# Patient Record
Sex: MALE | Race: WHITE | Employment: UNEMPLOYED | URBAN - METROPOLITAN AREA
[De-identification: names, ages, dates, MRNs, and addresses within clinical notes are randomized per-mention and may not be internally consistent; named-entity substitution may affect disease eponyms.]

---

## 2020-08-30 ENCOUNTER — APPOINTMENT (OUTPATIENT)
Dept: GENERAL RADIOLOGY | Age: 25
End: 2020-08-30
Attending: EMERGENCY MEDICINE

## 2020-08-30 ENCOUNTER — HOSPITAL ENCOUNTER (EMERGENCY)
Age: 25
Discharge: HOME | End: 2020-08-30
Attending: EMERGENCY MEDICINE

## 2020-08-30 VITALS
RESPIRATION RATE: 16 BRPM | SYSTOLIC BLOOD PRESSURE: 148 MMHG | TEMPERATURE: 98.6 F | HEIGHT: 70 IN | DIASTOLIC BLOOD PRESSURE: 72 MMHG | HEART RATE: 81 BPM | WEIGHT: 136.69 LBS | BODY MASS INDEX: 19.57 KG/M2 | OXYGEN SATURATION: 100 %

## 2020-08-30 DIAGNOSIS — S63.501A WRIST SPRAIN, RIGHT, INITIAL ENCOUNTER: Primary | ICD-10-CM

## 2020-08-30 PROCEDURE — 99282 EMERGENCY DEPT VISIT SF MDM: CPT

## 2020-08-30 PROCEDURE — 73130 X-RAY EXAM OF HAND: CPT

## 2020-08-30 PROCEDURE — 73110 X-RAY EXAM OF WRIST: CPT

## 2020-08-30 PROCEDURE — 74011250637 HC RX REV CODE- 250/637: Performed by: EMERGENCY MEDICINE

## 2020-08-30 RX ORDER — HYDROCODONE BITARTRATE AND ACETAMINOPHEN 5; 325 MG/1; MG/1
2 TABLET ORAL
Status: COMPLETED | OUTPATIENT
Start: 2020-08-30 | End: 2020-08-30

## 2020-08-30 RX ORDER — IBUPROFEN 600 MG/1
600 TABLET ORAL
Status: COMPLETED | OUTPATIENT
Start: 2020-08-30 | End: 2020-08-30

## 2020-08-30 RX ORDER — IBUPROFEN 600 MG/1
600 TABLET ORAL
Qty: 20 TAB | Refills: 0 | Status: SHIPPED | OUTPATIENT
Start: 2020-08-30

## 2020-08-30 RX ADMIN — HYDROCODONE BITARTRATE AND ACETAMINOPHEN 2 TABLET: 5; 325 TABLET ORAL at 01:59

## 2020-08-30 RX ADMIN — IBUPROFEN 600 MG: 600 TABLET ORAL at 01:57

## 2020-08-30 NOTE — ED PROVIDER NOTES
EMERGENCY DEPARTMENT HISTORY AND PHYSICAL EXAM      Date: 8/30/2020  Patient Name: Ruth Bonilla  Patient Age and Sex: 23 y.o. male    History of Presenting Illness     Chief Complaint   Patient presents with    Hand Injury     right, punched a car window earlier today. History Provided By: Patient    Ability to gather history was limited by:     HPI: Ruth Bonilla, 23 y.o. male complains of right wrist pain, throbbing, severe intensity, after punching a car window with his right hand a few hours ago. He is right-handed. No numbness or weakness, no lacerations. Location:    Quality:      Severity:    Duration:   Timing:      Context:    Modifying factors:   Associated symptoms:       The patient's medical, surgical, family, and social history on file were reviewed by me today. No past medical history on file. No past surgical history on file. PCP: No primary care provider on file. Past History     Past Medical History:  No past medical history on file. Past Surgical History:  No past surgical history on file. Family History:  No family history on file. Social History:  Social History     Tobacco Use    Smoking status: Not on file   Substance Use Topics    Alcohol use: Not on file    Drug use: Not on file       Allergies:  No Known Allergies    Current Medications:  No current facility-administered medications on file prior to encounter. No current outpatient medications on file prior to encounter. Review of Systems   Review of Systems   Neurological: Negative for weakness and numbness. All other systems reviewed and are negative. Physical Exam   Vital Signs  Patient Vitals for the past 8 hrs:   Temp Pulse Resp BP SpO2   08/30/20 0122 98.6 °F (37 °C) 81 16 148/72 100 %          Physical Exam  Vitals signs reviewed. Constitutional:       General: He is not in acute distress. Appearance: He is not toxic-appearing.    Musculoskeletal:      Right wrist: He exhibits decreased range of motion and tenderness. He exhibits no swelling and no deformity. Skin:     General: Skin is warm and dry. Findings: No bruising or laceration. Neurological:      Mental Status: He is alert. Sensory: Sensation is intact. No sensory deficit. Motor: Motor function is intact. No weakness. Psychiatric:         Mood and Affect: Mood normal.         Behavior: Behavior normal. Behavior is not agitated. Cognition and Memory: Cognition normal.         Diagnostic Study Results   Labs  No results found for this or any previous visit (from the past 24 hour(s)). Radiologic Studies  XR WRIST RT AP/LAT/OBL MIN 3V   Final Result   IMPRESSION: No acute abnormality. XR HAND RT MIN 3 V   Final Result   IMPRESSION: No acute abnormality. CT Results  (Last 48 hours)    None        CXR Results  (Last 48 hours)    None          Procedures   Procedures    Medical Decision Making     I reviewed the patient's most recent Emergency Dept notes and diagnostic tests  in formulating my MDM on today's visit. Provider Notes (Medical Decision Making):   72-year-old male, right-handed, punched a window a few hours ago, complaining of focal right wrist pain. Normal neurovascular exam.  He has tenderness and some limitation of range of motion of the right wrist.  No significant deformity. No lacerations. X-rays of the hand and wrist are negative for acute fracture or dislocation. I suspect that he simply sprained his wrist.  Will place prefabricated wrist splint for comfort, ibuprofen, ice, follow-up with orthopedics as needed.     Vandana Lopez MD  2:21 AM        Social History     Tobacco Use    Smoking status: Not on file   Substance Use Topics    Alcohol use: Not on file    Drug use: Not on file     Patient Vitals for the past 4 hrs:   Temp Pulse Resp BP SpO2   08/30/20 0122 98.6 °F (37 °C) 81 16 148/72 100 %            Consults:      Medications Administered during ED course:  Medications   ibuprofen (MOTRIN) tablet 600 mg (has no administration in time range)   HYDROcodone-acetaminophen (NORCO) 5-325 mg per tablet 2 Tab (has no administration in time range)          Current Discharge Medication List      START taking these medications    Details   ibuprofen (MOTRIN) 600 mg tablet Take 1 Tab by mouth every six (6) hours as needed for Pain. Qty: 20 Tab, Refills: 0                Diagnosis and Disposition     Disposition:  Discharged    Clinical Impression:   1. Wrist sprain, right, initial encounter        Attestation:  I personally performed the services described in this documentation on this date 8/30/2020 for patient Mary Fisher. Taylor Martino MD        I was the first provider for this patient on this visit. To the best of my ability I reviewed relevant prior medical records, electrocardiograms, laboratories, and radiologic studies. The patient's presenting problems were discussed, and the patient was in agreement with the care plan formulated and outlined with them. Taylor Martino MD    Please note that this dictation was completed with Dragon voice recognition software. Quite often unanticipated grammatical, syntax, homophones, and other interpretive errors are inadvertently transcribed by the computer software. Please disregard these errors and excuse any errors that have escaped final proofreading.

## 2020-08-30 NOTE — ED NOTES
Patient was provided with discharge instructions. Instructions and any medications were reviewed with the patient &/or family by Dr. Heydi Truk. Questions and concerns addressed by the provider. Patient discharged in stable condition via ambulatory and was unaccompanied.

## 2023-05-15 RX ORDER — IBUPROFEN 600 MG/1
600 TABLET ORAL EVERY 6 HOURS PRN
COMMUNITY
Start: 2020-08-30